# Patient Record
Sex: MALE | Race: OTHER | NOT HISPANIC OR LATINO | URBAN - METROPOLITAN AREA
[De-identification: names, ages, dates, MRNs, and addresses within clinical notes are randomized per-mention and may not be internally consistent; named-entity substitution may affect disease eponyms.]

---
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
## 2023-08-01 NOTE — ED PROVIDER NOTE - CPE EDP EYES NORM
[Dear  ___] : Dear  [unfilled], [Consult Letter:] : I had the pleasure of evaluating your patient, [unfilled]. [Please see my note below.] : Please see my note below. [Consult Closing:] : Thank you very much for allowing me to participate in the care of this patient.  If you have any questions, please do not hesitate to contact me. [Sincerely,] : Sincerely, [FreeTextEntry2] : Dr. STEPHANIE GUZMÁN,  [FreeTextEntry3] : Lesley Sanabria MD\par  Attending Physician, Division of Allergy and Immunology\par  , Departments of Medicine and Pediatrics\par  [employer] [facility]\par  [facility] \par  [facility]  normal...

## 2025-03-26 NOTE — ED ADULT TRIAGE NOTE - PAIN: PRESENCE, MLM
Recent inguinal and umbilical hernia surgery on 3/11/2025  Continue to monitor pain  Plan per medical    complains of pain/discomfort/headache